# Patient Record
Sex: FEMALE | Race: BLACK OR AFRICAN AMERICAN | Employment: FULL TIME | URBAN - METROPOLITAN AREA
[De-identification: names, ages, dates, MRNs, and addresses within clinical notes are randomized per-mention and may not be internally consistent; named-entity substitution may affect disease eponyms.]

---

## 2020-08-03 ENCOUNTER — DOCUMENTATION ONLY (OUTPATIENT)
Dept: NEUROLOGY | Age: 30
End: 2020-08-03

## 2020-08-03 ENCOUNTER — OFFICE VISIT (OUTPATIENT)
Dept: NEUROLOGY | Age: 30
End: 2020-08-03
Payer: COMMERCIAL

## 2020-08-03 VITALS
SYSTOLIC BLOOD PRESSURE: 118 MMHG | DIASTOLIC BLOOD PRESSURE: 76 MMHG | HEART RATE: 66 BPM | HEIGHT: 63 IN | WEIGHT: 212 LBS | RESPIRATION RATE: 20 BRPM | BODY MASS INDEX: 37.56 KG/M2

## 2020-08-03 DIAGNOSIS — R20.2 NUMBNESS AND TINGLING IN LEFT HAND: ICD-10-CM

## 2020-08-03 DIAGNOSIS — G43.109 COMPLICATED MIGRAINE: Primary | ICD-10-CM

## 2020-08-03 DIAGNOSIS — E66.01 SEVERE OBESITY (HCC): ICD-10-CM

## 2020-08-03 DIAGNOSIS — R20.0 NUMBNESS AND TINGLING IN LEFT HAND: ICD-10-CM

## 2020-08-03 PROCEDURE — 99205 OFFICE O/P NEW HI 60 MIN: CPT | Performed by: PSYCHIATRY & NEUROLOGY

## 2020-08-03 RX ORDER — ONDANSETRON 4 MG/1
TABLET, ORALLY DISINTEGRATING ORAL
COMMUNITY
Start: 2020-07-27 | End: 2022-02-22

## 2020-08-03 RX ORDER — NORETHINDRONE ACETATE AND ETHINYL ESTRADIOL AND FERROUS FUMARATE 1MG-20(21)
KIT ORAL
COMMUNITY
Start: 2020-07-13 | End: 2022-02-22

## 2020-08-03 RX ORDER — TOPIRAMATE 25 MG/1
TABLET ORAL
Qty: 120 TAB | Refills: 1 | Status: SHIPPED | OUTPATIENT
Start: 2020-08-03 | End: 2020-09-02 | Stop reason: ALTCHOICE

## 2020-08-03 RX ORDER — BUTALBITAL, ACETAMINOPHEN AND CAFFEINE 50; 325; 40 MG/1; MG/1; MG/1
TABLET ORAL
COMMUNITY
Start: 2020-07-27 | End: 2022-02-22

## 2020-08-03 NOTE — PROGRESS NOTES
Has a history of migraines off and on since she was in the 5th grade, really bad with her last pregnancy, in the last 2 months have started to become more frequent and more intense   They are daily now, they do get to the intensity that she has nausea and vomitting   Light/sound sensitivity

## 2020-08-03 NOTE — PATIENT INSTRUCTIONS
Migraine Headache: Care Instructions Your Care Instructions Migraines are painful, throbbing headaches that often start on one side of the head. They may cause nausea and vomiting and make you sensitive to light, sound, or smell. Without treatment, migraines can last from 4 hours to a few days. Medicines can help prevent migraines or stop them after they have started. Your doctor can help you find which ones work best for you. Follow-up care is a key part of your treatment and safety. Be sure to make and go to all appointments, and call your doctor if you are having problems. It's also a good idea to know your test results and keep a list of the medicines you take. How can you care for yourself at home? · Do not drive if you have taken a prescription pain medicine. · Rest in a quiet, dark room until your headache is gone. Close your eyes, and try to relax or go to sleep. Don't watch TV or read. · Put a cold, moist cloth or cold pack on the painful area for 10 to 20 minutes at a time. Put a thin cloth between the cold pack and your skin. · Use a warm, moist towel or a heating pad set on low to relax tight shoulder and neck muscles. · Have someone gently massage your neck and shoulders. · Take your medicines exactly as prescribed. Call your doctor if you think you are having a problem with your medicine. You will get more details on the specific medicines your doctor prescribes. · Don't take medicine for headache pain too often. Talk to your doctor if you are taking medicine more than 2 days a week to stop a headache. Taking too much pain medicine can lead to more headaches. These are called medicine-overuse headaches. To prevent migraines · Keep a headache diary so you can figure out what triggers your headaches. Avoiding triggers may help you prevent headaches. Record when each headache began, how long it lasted, and what the pain was like.  Write down any other symptoms you had with the headache, such as nausea, flashing lights or dark spots, or sensitivity to bright light or loud noise. Note if the headache occurred near your period. List anything that might have triggered the headache. Triggers may include certain foods (chocolate, cheese, wine) or odors, smoke, bright light, stress, or lack of sleep. · If your doctor has prescribed medicine for your migraines, take it as directed. You may have medicine that you take only when you get a migraine and medicine that you take all the time to help prevent migraines. ? If your doctor has prescribed medicine for when you get a headache, take it at the first sign of a migraine, unless your doctor has given you other instructions. ? If your doctor has prescribed medicine to prevent migraines, take it exactly as prescribed. Call your doctor if you think you are having a problem with your medicine. · Find healthy ways to deal with stress. Migraines are most common during or right after stressful times. Try finding ways to reduce stress like practicing mindfulness or deep breathing exercises. · Get plenty of sleep and exercise. But be careful to not push yourself too hard during exercise. It may trigger a headache. · Eat meals on a regular schedule. Avoid foods and drinks that often trigger migraines. These include chocolate, alcohol (especially red wine and port), aspartame, monosodium glutamate (MSG), and some additives found in foods (such as hot dogs, arriola, cold cuts, aged cheeses, and pickled foods). · Limit caffeine. Don't drink too much coffee, tea, or soda. But don't quit caffeine suddenly. That can also give you migraines. · Do not smoke or allow others to smoke around you. If you need help quitting, talk to your doctor about stop-smoking programs and medicines. These can increase your chances of quitting for good.  
· If you are taking birth control pills or hormone therapy, talk to your doctor about whether they are triggering your migraines. When should you call for help? [id number] anytime you think you may need emergency care. For example, call if: 
· You have signs of a stroke. These may include: 
? Sudden numbness, paralysis, or weakness in your face, arm, or leg, especially on only one side of your body. ? Sudden vision changes. ? Sudden trouble speaking. ? Sudden confusion or trouble understanding simple statements. ? Sudden problems with walking or balance. ? A sudden, severe headache that is different from past headaches. Call your doctor now or seek immediate medical care if: 
· You have new or worse nausea and vomiting. · You have a new or higher fever. · Your headache gets much worse. Watch closely for changes in your health, and be sure to contact your doctor if: 
· You are not getting better after 2 days (48 hours). Where can you learn more? Go to http://lucille-guille.info/ Enter M496 in the search box to learn more about \"Migraine Headache: Care Instructions. \" Current as of: November 20, 2019               Content Version: 12.5 © 7419-6113 [employer]. Care instructions adapted under license by [employer] (which disclaims liability or warranty for this information). If you have questions about a medical condition or this instruction, always ask your healthcare professional. [address] any warranty or liability for your use of this information. Numbness and Tingling: Care Instructions Your Care Instructions Many things can cause numbness or tingling. Swelling may put pressure on a nerve. This could cause you to lose feeling or have a pins-and-needles sensation on part of your body. Nerves may be damaged from trauma, toxins, or diseases, such as diabetes or multiple sclerosis (MS). Sometimes, though, the cause is not clear. If there is no clear reason for your symptoms, and you are not having any other symptoms, your doctor may suggest watching and waiting for a while to see if the numbness or tingling goes away on its own. Your doctor may want you to have blood or nerve tests to find the cause of your symptoms. Follow-up care is a key part of your treatment and safety. Be sure to make and go to all appointments, and call your doctor if you are having problems. It's also a good idea to know your test results and keep a list of the medicines you take. How can you care for yourself at home? · If your doctor prescribes medicine, take it exactly as directed. Call your doctor if you think you are having a problem with your medicine. · If you have any swelling, put ice or a cold pack on the area for 10 to 20 minutes at a time. Put a thin cloth between the ice and your skin. When should you call for help? [id number] anytime you think you may need emergency care. For example, call if: 
· You have weakness, numbness, or tingling in both legs. · You lose bowel or bladder control. · You have symptoms of a stroke. These may include: 
? Sudden numbness, tingling, weakness, or loss of movement in your face, arm, or leg, especially on only one side of your body. ? Sudden vision changes. ? Sudden trouble speaking. ? Sudden confusion or trouble understanding simple statements. ? Sudden problems with walking or balance. ? A sudden, severe headache that is different from past headaches. Watch closely for changes in your health, and be sure to contact your doctor if you have any problems, or if: 
· You do not get better as expected. Where can you learn more? Go to http://www.gray.com/ Enter H267 in the search box to learn more about \"Numbness and Tingling: Care Instructions. \" Current as of: November 20, 2019               Content Version: 12.5 © 3453-0549 [employer]. Care instructions adapted under license by [employer] (which disclaims liability or warranty for this information). If you have questions about a medical condition or this instruction, always ask your healthcare professional. [address] any warranty or liability for your use of this information.

## 2020-08-03 NOTE — PROGRESS NOTES
NEUROLOGY CLINIC NOTE    Patient ID:  Trae Winn  [id number]  35 y.o.  1990    Date of Consultation:  August 3, 2020    Referring Physician: [facility]    Reason for Consultation:  Headache and left sided numbness    Chief Complaint   Patient presents with    New Patient     headaches and numbness on left side of body for 3 days straight (hands, arms and legs)        History of Present Illness:     Patient Active Problem List    Diagnosis Date Noted    Severe obesity ([address] 2020     Past Medical History:   Diagnosis Date    Anxiety disorder     Depression     Headache     Vision decreased       Past Surgical History:   Procedure Laterality Date    HX  SECTION   and       Prior to Admission medications    Medication Sig Start Date End Date Taking? Authorizing Provider   Junel FE 1/20, 28, 1 mg-20 mcg (21)/75 mg (7) tab TAKE 1 TABLET BY MOUTH DAILY 20  Yes Provider, Historical   butalbital-acetaminophen-caffeine (FIORICET, ESGIC) -40 mg per tablet TAKE 1 TABLET BY MOUTH EVERY 6 HOURS AS NEEDED FOR HEADACHE 20  Yes Provider, Historical   ondansetron (ZOFRAN ODT) 4 mg disintegrating tablet PLACE 1 ON TONGUE EVERY 8 HOURS AS NEEDED FOR NAUSEA AND VOMITING 20  Yes Provider, Historical     No Known Allergies   Social History     Tobacco Use    Smoking status: Not on file   Substance Use Topics    Alcohol use: Not on file      No family history on file. Subjective:      Trae Winn is a 35 y.o. obese RH female with no significant medical history who was referred here from [facility] for further evaluation of her headaches and numbness. Concern was condition lasted ore than a day to 3 days of headache and numbness. Patient started having headaches when she started her menstrual cycle. Missed school because of it. Saw a doctor before who recommended Botox but deferred.  Headaches are worse now because they are more frequent and intense for the past two months. Gradual onset. Right temple and does not radiate anywhere. Throbbing pain. Starts at a 4/10 and at its worse a 10/10. Associated nausea, blurring of vision in both eyes, sensitive to light and noise, and numbness/tingling of the left hand and fingers. Lasts for 1 day and recently 3 days. Waxes and wanes. Currently having 1 per week. Last severe headache 9 days PTC. Skipping meals and stress may bring on a headache. Worse with physical activity. Cold compress, dark room and rest may help. OTC medications currently are not helping when Ibuprofen use to help. Fioricet helps. Ondansetron helps the nausea. No imaging done. Outside reports reviewed: none. Review of Systems:    A comprehensive review of systems was performed:   Constitutional: positive for poor appetite  Eyes: positive for visual disturbance  Ears, nose, mouth, throat, and face: positive for none  Respiratory: positive for none  Cardiovascular: positive for none  Gastrointestinal: positive for nausea  Genitourinary: positive for none  Integument/breast: positive for none  Hematologic/lymphatic: positive for none  Musculoskeletal: positive for none  Neurological: positive for headaches, numbness  Behavioral/Psych: positive for anxiety, depression  Endocrine: positive for none  Allergic/Immunologic: positive for none      Objective:     Visit Vitals  /76   Pulse 66   Resp 20   Ht 5' 3\" (1.6 m)   Wt 96.2 kg (212 lb)   BMI 37.55 kg/m²       PHYSICAL EXAM:    General Appearance: Alert, patient appears stated age. General:  Obese, patient in no apparent distress. Head/Face: The head is normocephalic and atraumatic. Eyes: Conjunctivae appear normal. Sclera appear normal and non-icteric. Nose (and Sinus):   No abnormality of the nose or sinuses is noted. Oral:   Throat is clear. Lymphatics:  No lymphadenopathy in the neck/head. Neck and Thyroid:   No bruits noted in the neck. Respiratory:  Lungs clear to auscultation. Cardiovascular:  Palpation and auscultation: regular rate and rhythm. Extremity: No joint swelling, erythema or pedal edema. NEUROLOGICAL EXAM:    Appearance: The patient is obese, provides a coherent history and is in no acute distress. Mental Status: Oriented to time, place and person. Fluent, no aphasia or dysarthria. Mood and affect appropriate. Cranial Nerves:   Intact visual fields. Fundi are benign. AJ, EOM's full, no nystagmus, no ptosis. Facial sensation is normal. Corneal reflexes are intact. Facial movement is symmetric. Hearing is normal bilaterally. Palate is midline with normal elevation. Sternocleidomastoid and trapezius muscles are normal. Tongue is midline. Motor:  5/5 strength in upper and lower proximal and distal muscles. Normal bulk and tone. No fasciculations. No pronator drift. Reflexes:   Deep tendon reflexes 2+/4 and symmetrical. Downgoing toes. Sensory:   Normal to cold, pinprick and vibration. Gait:  Normal gait. No Romberg. Can do tandem walking. Tremor:   No tremor noted. Cerebellar:  Intact FTN/MICKEY/HTS. Neurovascular:  Normal heart sounds and regular rhythm, peripheral pulses intact, and no carotid bruits. Assessment:   Complicated migraine - worsening  Numbness and tingling in the left hand  Severe obesity    Plan:   Neurological exam was non-focal. Need to consider complex or complicated migraines causing stroke like symptoms. Given patient's age, need to look for congenital or vascular pathology or subtle stroke. Brain MRI without contrast was ordered. Need to also assess for presence of a patent foramen ovale that is typically seen in this condition. Echocardiogram with bubble study was ordered to further assess. Given the frequency of her head warrants maintenance therapy. Trial of topiramate 25 mg at bedtime initially and up to 50 mg twice daily if necessary. Prescription was provided. Continue Fioricet for abortive therapy.   Further intervention be done pending results of testing. Potential trial of triptan's. Advised to refrain from things that could trigger a headache (dairy, chocolate, wine and etc.). Advised to keep a headache log. I need to obtain a copy [facility].    Episodic left hand numbness and tingling in relation to her headaches is concerning for possible complex or complicated migraines with strokelike symptoms versus acute stroke. Brain MRI as ordered above. If brain MRI is unremarkable and condition persist then will do EMG/NCS studies of the left upper extremity to look for entrapment neuropathy. Encourage weight loss. Use of Topiramate for migraine prophylaxis may help with weight loss as well. Recommend routine structured exercises. All questions and concerns were answered. This note was created using voice recognition software. Despite editing, there may be syntax errors.

## 2020-08-17 ENCOUNTER — HOSPITAL ENCOUNTER (OUTPATIENT)
Dept: NON INVASIVE DIAGNOSTICS | Age: 30
Discharge: HOME | End: 2020-08-17
Attending: PSYCHIATRY & NEUROLOGY
Payer: COMMERCIAL

## 2020-08-17 ENCOUNTER — HOSPITAL ENCOUNTER (OUTPATIENT)
Dept: MRI IMAGING | Age: 30
Discharge: HOME | End: 2020-08-17
Attending: PSYCHIATRY & NEUROLOGY
Payer: COMMERCIAL

## 2020-08-17 VITALS
SYSTOLIC BLOOD PRESSURE: 121 MMHG | DIASTOLIC BLOOD PRESSURE: 70 MMHG | WEIGHT: 212 LBS | BODY MASS INDEX: 37.56 KG/M2 | HEIGHT: 63 IN

## 2020-08-17 DIAGNOSIS — R20.0 NUMBNESS AND TINGLING IN LEFT HAND: ICD-10-CM

## 2020-08-17 DIAGNOSIS — R20.2 NUMBNESS AND TINGLING IN LEFT HAND: ICD-10-CM

## 2020-08-17 DIAGNOSIS — G43.109 COMPLICATED MIGRAINE: ICD-10-CM

## 2020-08-17 LAB
ECHO AO ASC DIAM: 2.16 CM
ECHO AV AREA PEAK VELOCITY: 2.12 CM2
ECHO AV AREA PEAK VELOCITY: 2.17 CM2
ECHO AV AREA PEAK VELOCITY: 2.23 CM2
ECHO AV AREA PEAK VELOCITY: 2.28 CM2
ECHO AV AREA VTI: 2.32 CM2
ECHO AV AREA/BSA VTI: 1.2 CM2/M2
ECHO AV MEAN GRADIENT: 4.5 MMHG
ECHO AV PEAK GRADIENT: 10.39 MMHG
ECHO AV PEAK GRADIENT: 9.38 MMHG
ECHO AV PEAK VELOCITY: 153.1 CM/S
ECHO AV PEAK VELOCITY: 161.16 CM/S
ECHO AV VTI: 30.62 CM
ECHO EST RA PRESSURE: 10 MMHG
ECHO LA AREA 4C: 17.43 CM2
ECHO LA MAJOR AXIS: 2.83 CM
ECHO LA MINOR AXIS: 1.43 CM
ECHO LA VOL 4C: 50.09 ML (ref 22–52)
ECHO LA VOLUME INDEX A4C: 25.27 ML/M2 (ref 16–28)
ECHO LV E' LATERAL VELOCITY: 14.16 CENTIMETER/SECOND
ECHO LV E' SEPTAL VELOCITY: 13.23 CENTIMETER/SECOND
ECHO LV EDV A4C: 125.83 ML
ECHO LV EDV INDEX A4C: 63.5 ML/M2
ECHO LV EJECTION FRACTION A4C: 57 PERCENT
ECHO LV ESV A4C: 54.4 ML
ECHO LV ESV INDEX A4C: 27.4 ML/M2
ECHO LV INTERNAL DIMENSION DIASTOLIC MMODE: 4.54 CM
ECHO LV INTERNAL DIMENSION DIASTOLIC: 4.74 CM (ref 3.9–5.3)
ECHO LV INTERNAL DIMENSION DIASTOLIC: 5.16 CM (ref 3.9–5.3)
ECHO LV INTERNAL DIMENSION SYSTOLIC MMODE: 2.9 CM
ECHO LV INTERNAL DIMENSION SYSTOLIC: 3.1 CM
ECHO LV INTERNAL DIMENSION SYSTOLIC: 3.48 CM
ECHO LV IVSD: 0.77 CM (ref 0.6–0.9)
ECHO LV POSTERIOR WALL DIASTOLIC: 1.21 CM (ref 0.6–0.9)
ECHO LVOT DIAM: 1.77 CM
ECHO LVOT PEAK GRADIENT: 7.66 MMHG
ECHO LVOT PEAK GRADIENT: 8.05 MMHG
ECHO LVOT PEAK VELOCITY: 138.37 CM/S
ECHO LVOT PEAK VELOCITY: 141.88 CM/S
ECHO LVOT SV: 70.9 ML
ECHO LVOT VTI: 28.75 CM
ECHO MV A VELOCITY: 75.9 CENTIMETER/SECOND
ECHO MV AREA PHT: 4.26 CM2
ECHO MV AREA PHT: 4.51 CM2
ECHO MV AREA VTI: 2.3 CM2
ECHO MV E DECELERATION TIME (DT): 0.17 S
ECHO MV E VELOCITY: 107.1 CENTIMETER/SECOND
ECHO MV MAX VELOCITY: 132.87 CM/S
ECHO MV MEAN GRADIENT: 2.13 MMHG
ECHO MV PEAK GRADIENT: 7.06 MMHG
ECHO MV PRESSURE HALF TIME (PHT): 0.05 S
ECHO MV PRESSURE HALF TIME (PHT): 0.05 S
ECHO MV VTI: 30.78 CM
ECHO PV MAX VELOCITY: 102.72 CM/S
ECHO PV MAX VELOCITY: 107.7 CM/S
ECHO PV MEAN GRADIENT: 2.76 MMHG
ECHO PV PEAK INSTANTANEOUS GRADIENT SYSTOLIC: 4.22 MMHG
ECHO PV PEAK INSTANTANEOUS GRADIENT SYSTOLIC: 4.64 MMHG
ECHO PV VTI: 24.61 CM
ECHO RA AREA 4C: 14.05 CM2
ECHO RIGHT VENTRICULAR SYSTOLIC PRESSURE (RVSP): 31.42 MMHG
ECHO TV REGURGITANT MAX VELOCITY: 231.38 CM/S
ECHO TV REGURGITANT PEAK GRADIENT: 21.42 MMHG
LVOT MG: 4.14 MMHG

## 2020-08-17 PROCEDURE — 70551 MRI BRAIN STEM W/O DYE: CPT

## 2020-08-17 PROCEDURE — 96374 THER/PROPH/DIAG INJ IV PUSH: CPT

## 2020-08-18 ENCOUNTER — TELEPHONE (OUTPATIENT)
Dept: NEUROLOGY | Age: 30
End: 2020-08-18

## 2020-08-19 ENCOUNTER — OFFICE VISIT (OUTPATIENT)
Dept: NEUROLOGY | Age: 30
End: 2020-08-19
Payer: COMMERCIAL

## 2020-08-19 VITALS
DIASTOLIC BLOOD PRESSURE: 68 MMHG | HEART RATE: 83 BPM | OXYGEN SATURATION: 98 % | SYSTOLIC BLOOD PRESSURE: 112 MMHG | RESPIRATION RATE: 20 BRPM

## 2020-08-19 DIAGNOSIS — G43.109 COMPLICATED MIGRAINE: Primary | ICD-10-CM

## 2020-08-19 DIAGNOSIS — R20.2 NUMBNESS AND TINGLING IN LEFT HAND: ICD-10-CM

## 2020-08-19 DIAGNOSIS — Q21.12 PFO (PATENT FORAMEN OVALE): ICD-10-CM

## 2020-08-19 DIAGNOSIS — R20.0 NUMBNESS AND TINGLING IN LEFT HAND: ICD-10-CM

## 2020-08-19 PROCEDURE — 99214 OFFICE O/P EST MOD 30 MIN: CPT | Performed by: PSYCHIATRY & NEUROLOGY

## 2020-08-19 NOTE — PROGRESS NOTES
NEUROLOGY CLINIC NOTE    Patient ID:  Xiomara Warren  [id number]  35 y.o.  1990    Date of Visit:  2020    Referring Physician: [facility]    Reason for Visit:  Headache and left sided numbness    Chief Complaint   Patient presents with    Follow-up     test results        History of Present Illness:     Patient Active Problem List    Diagnosis Date Noted    Severe obesity ([address] 2020     Past Medical History:   Diagnosis Date    Anxiety disorder     Depression     Headache     Vision decreased       Past Surgical History:   Procedure Laterality Date    HX  SECTION   and       Prior to Admission medications    Medication Sig Start Date End Date Taking? Authorizing Provider   Junel FE 1/20, , 1 mg-20 mcg (21)/75 mg (7) tab TAKE 1 TABLET BY MOUTH DAILY 20  Yes Provider, Historical   butalbital-acetaminophen-caffeine (FIORICET, ESGIC) -40 mg per tablet TAKE 1 TABLET BY MOUTH EVERY 6 HOURS AS NEEDED FOR HEADACHE 20  Yes Provider, Historical   ondansetron (ZOFRAN ODT) 4 mg disintegrating tablet PLACE 1 ON TONGUE EVERY 8 HOURS AS NEEDED FOR NAUSEA AND VOMITING 20  Yes Provider, Historical   topiramate (TOPAMAX) 25 mg tablet Take 1 at bedtime x 1 wk; then 1 BID x 1 wk; then 1 in am and 2 in pm x 1 wk; then 2 BID  Patient taking differently: Take 25 mg by mouth.  Take 1 BID x 1 wk; then 1 in am and 2 in pm x 1 wk; then 2 BID 8/3/20  Yes Malaika Santos MD     No Known Allergies   Social History     Tobacco Use    Smoking status: Never Smoker    Smokeless tobacco: Never Used   Substance Use Topics    Alcohol use: Never     Frequency: Never      Family History   Problem Relation Age of Onset    No Known Problems Mother     No Known Problems Father         Subjective:      Xiomara Warren is a 35 y.o. obese RH female with no significant medical history who was referred here from [facility] for further evaluation of her headaches and numbness and is here for follow up. Concern was condition lasted ore than a day to 3 days of headache and numbness. Patient started having headaches when she started her menstrual cycle. Missed school because of it. Saw a doctor before who recommended Botox but deferred. Headaches are worse now because they are more frequent and intense for the past two months. Gradual onset. Right temple and does not radiate anywhere. Throbbing pain. Starts at a 4/10 and at its worse a 10/10. Associated nausea, blurring of vision in both eyes, sensitive to light and noise, and numbness/tingling of the left hand and fingers. Lasts for 1 day and recently 3 days. Waxes and wanes. Currently having 1 per week. Last severe headache 9 days PTC. Skipping meals and stress may bring on a headache. Worse with physical activity. Cold compress, dark room and rest may help. OTC medications currently are not helping when Ibuprofen use to help. Fioricet helps. Ondansetron helps the nausea. Since the last visit on 8/3/2020, patient underwent a brain MRI without contrast 8/17/2020 on my review was unremarkable with mild left maxillary sinus disease. Echocardiogram with bubble study done 8/17/2020 revealed EF of 60 to 65%. No shunting at baseline and mild right to left shunt on Valsalva maneuver. On her last visit patient was started on topiramate and is now taking 25 mg twice daily. Denies any side effects. Reports benefit. Last headache was 1 week prior to consult and was not as intense. 7/10 and responded to as needed Fioricet. Since starting the Topamax patient reports resolution of the numbness and tingling of the left hand. No new complaint. Outside reports reviewed: none.     Review of Systems:    A comprehensive review of systems was performed:   Constitutional: positive for none  Eyes: positive for visual disturbance  Ears, nose, mouth, throat, and face: positive for none  Respiratory: positive for none  Cardiovascular: positive for none  Gastrointestinal: positive for nausea  Genitourinary: positive for none  Integument/breast: positive for none  Hematologic/lymphatic: positive for none  Musculoskeletal: positive for none  Neurological: positive for headaches, numbness  Behavioral/Psych: positive for anxiety, depression  Endocrine: positive for none  Allergic/Immunologic: positive for none      Objective:     Visit Vitals  /68   Pulse 83   Resp 20   SpO2 98%       PHYSICAL EXAM:    NEUROLOGICAL EXAM:    Appearance: The patient is obese, provides a coherent history and is in no acute distress. Mental Status: Oriented to time, place and person. Fluent, no aphasia or dysarthria. Mood and affect appropriate. Cranial Nerves:   II - XII were intact. Motor:  5/5 strength in upper and lower proximal and distal muscles. Normal bulk and tone. No fasciculations. No pronator drift. Reflexes:   Deep tendon reflexes 2+/4 and symmetrical. Downgoing toes. Sensory:   Normal to cold, pinprick and vibration. Gait:  Normal gait. No Romberg. Can do tandem walking. Tremor:   No tremor noted. Cerebellar:  Intact FTN/MICKEY/HTS. Assessment:   Complicated migraine - stable with exacerbation  Patent foramen ovale  Numbness and tingling in the left hand    Plan:   Neurological exam is unchanged. It is still non-focal. Need to consider complex or complicated migraines causing stroke like symptoms. Given patient's age, need to look for congenital or vascular pathology or subtle stroke. Brain MRI without contrast did not reveal any significant pathology. Mild left maxillary sinus disease. Echocardiogram with bubble study revealed normal EF with shunting on valsalva maneuver. Positive for PFO. Given the frequency of her head warrants maintenance therapy. Continue Topiramate 25 mg BID and up to 50 mg twice daily if necessary. Continue Fioricet for abortive therapy. Further intervention be done pending results of testing.   Potential trial of triptan's. Advised to refrain from things that could trigger a headache (dairy, chocolate, wine and etc.). Advised to keep a headache log. I need to obtain a copy of [facility] records [facility].    PFO on echocardiogram.  Discussed given the results of her brain MRI being unremarkable with no evidence of old stroke or new stroke, patient was started on aspirin 81 mg daily. Patient also referred to cardiology for further evaluation. Condition has resolved. Episodic left hand numbness and tingling in relation to her headaches is concerning for possible complex or complicated migraines with strokelike symptoms. Brain MRI was unremarkable. If condition recurs, then I will do EMG/NCS studies of the left upper extremity to look for entrapment neuropathy. All questions and concerns were answered. This note was created using voice recognition software. Despite editing, there may be syntax errors.

## 2020-09-02 ENCOUNTER — OFFICE VISIT (OUTPATIENT)
Dept: CARDIOLOGY CLINIC | Age: 30
End: 2020-09-02
Payer: COMMERCIAL

## 2020-09-02 VITALS
WEIGHT: 216 LBS | RESPIRATION RATE: 20 BRPM | BODY MASS INDEX: 38.27 KG/M2 | DIASTOLIC BLOOD PRESSURE: 60 MMHG | HEIGHT: 63 IN | SYSTOLIC BLOOD PRESSURE: 110 MMHG | HEART RATE: 94 BPM

## 2020-09-02 DIAGNOSIS — Q21.12 PFO (PATENT FORAMEN OVALE): Primary | ICD-10-CM

## 2020-09-02 PROCEDURE — 99203 OFFICE O/P NEW LOW 30 MIN: CPT | Performed by: INTERNAL MEDICINE

## 2020-09-02 PROCEDURE — 93000 ELECTROCARDIOGRAM COMPLETE: CPT | Performed by: INTERNAL MEDICINE

## 2020-09-02 RX ORDER — TOPIRAMATE 25 MG/1
50 TABLET ORAL DAILY
COMMUNITY
End: 2020-09-24 | Stop reason: SDUPTHER

## 2020-09-02 NOTE — PROGRESS NOTES
Visit Vitals  /60   Pulse 94   Resp 20   Ht 5' 3\" (1.6 m)   Wt 216 lb (98 kg)   BMI 38.26 kg/m²     PFO by ECHO  No complaints  migraines

## 2020-09-02 NOTE — PROGRESS NOTES
Reason for Consult: PFO    Referring: Dr. Justyn Brothers    HPI: Sebastian Singh is a 35 y.o. female with no significant past medical history is being evaluated for PFO identified an echocardiogram performed on August 17, 2020. Echo was performed because of her history of migraine headaches and possible symptoms of TIA or stroke. She also has symptoms of left side numbness and tingling of the arm. Her MRI of the brain was negative for stroke or TIA. Echocardiogram demonstrated a small right-to-left shunt on agitated saline study with provocative testing but no shunting noted at baseline. EF was normal with mild LVH of the posterior wall and trace mitral regurgitation. She has not had any previous history of stroke. No previous history of deoxygenation. She has had 2 pregnancies without any events. She does not smoke tobacco.  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 EKG today demonstrated normal sinus rhythm, normal axis, normal intervals, normal ST segment. Echocardiogram from August 17, 2020 was personally reviewed and was personally read by me. Plan:    1. PFO: Currently she has a very small right-to-left shunt on provocative maneuvers only. Clinically as well as from imaging standpoint there is no clear indication that this PFO is causing any symptoms. At this time will only recommend observation. Since there is no true TIA event therefore I think she does not require ASA from the PFO standpoint. She can stop aspirin if needed. No further follow-up is recommended at this time. If she has any stroke in future then she may require further evaluation of this PFO. We discussed the etiology, pathophysiology as well as prognosis of the PFO. 2.  I advised her to monitor her blood pressure at home. 3.  Follow-up as needed. ATTENTION:   This medical record was transcribed using an electronic medical records/speech recognition system.   Although proofread, it may and can contain electronic, spelling and other errors. Corrections may be executed at a later time. Please feel free to contact us for any clarifications as needed.       Past Medical History:   Diagnosis Date    Anxiety disorder     Depression     Headache     Vision decreased             Past Surgical History:   Procedure Laterality Date    HX  SECTION   and              Family History   Problem Relation Age of Onset    No Known Problems Mother     No Known Problems Father            Social History     Socioeconomic History    Marital status:      Spouse name: Not on file    Number of children: Not on file    Years of education: Not on file    Highest education level: Not on file   Occupational History    Not on file   Social Needs    Financial resource strain: Not on file    Food insecurity     Worry: Not on file     Inability: Not on file    Transportation needs     Medical: Not on file     Non-medical: Not on file   Tobacco Use    Smoking status: Never Smoker    Smokeless tobacco: Never Used   Substance and Sexual Activity    Alcohol use: Never     Frequency: Never    Drug use: Never    Sexual activity: Yes     Partners: Male     Birth control/protection: Pill   Lifestyle    Physical activity     Days per week: Not on file     Minutes per session: Not on file    Stress: Not on file   Relationships    Social connections     Talks on phone: Not on file     Gets together: Not on file     Attends Orthodoxy service: Not on file     Active member of club or organization: Not on file     Attends meetings of clubs or organizations: Not on file     Relationship status: Not on file    Intimate partner violence     Fear of current or ex partner: Not on file     Emotionally abused: Not on file     Physically abused: Not on file     Forced sexual activity: Not on file   Other Topics Concern    Not on file   Social History Narrative    Not on file         No Known Allergies         Current Outpatient Medications   Medication Sig Dispense Refill    topiramate (Topamax) 25 mg tablet Take 50 mg by mouth daily.  Junel FE 1/20, 28, 1 mg-20 mcg (21)/75 mg (7) tab TAKE 1 TABLET BY MOUTH DAILY      butalbital-acetaminophen-caffeine (FIORICET, ESGIC) -40 mg per tablet TAKE 1 TABLET BY MOUTH EVERY 6 HOURS AS NEEDED FOR HEADACHE      ondansetron (ZOFRAN ODT) 4 mg disintegrating tablet PLACE 1 ON TONGUE EVERY 8 HOURS AS NEEDED FOR NAUSEA AND VOMITING          ROS:  12 point review of systems was performed.  All negative except for HPI     Physical Exam:  Visit Vitals  /60   Pulse 94   Resp 20   Ht 5' 3\" (1.6 m)   Wt 216 lb (98 kg)   BMI 38.26 kg/m²       Gen:  Well-developed, well-nourished, in no acute distress  HEENT:  Pink conjunctivae, PERRL, hearing intact to voice, moist mucous membranes  Neck:  Supple, without masses, thyroid non-tender  Resp:  No accessory muscle use, clear breath sounds without wheezes rales or rhonchi  Card:  No murmurs, normal S1, S2 without thrills, bruits or peripheral edema  Abd:  Soft, non-tender, non-distended, normoactive bowel sounds are present, no palpable organomegaly and no detectable hernias  Lymph:  No cervical or inguinal adenopathy  Musc:  No cyanosis or clubbing  Skin:  No rashes or ulcers, skin turgor is good  Neuro:  Cranial nerves are grossly intact, no focal motor weakness, follows commands appropriately  Psych:  Good insight, oriented to person, place and time, alert     Labs:     No results found for: WBC, WBCT, WBCPOC, HGB, HGBPOC, HCT, HCTPOC, PLT, PLTPOC, MCV, MCVPOC, HGBEXT, HCTEXT, PLTEXT  No results found for: HBA1C, BIS5LKBK, HGBE8, GLU, GESTF, GLUCPOC, MCACR, MCA1, MCA2, MCA3, MCAU, LDL, LDLC, DLDLP, FRANK, CREAPOC, ACREA, CREA, REFC3, REFC4, IVV1VBJC   No results found for: CHOL, CHOLPOCT, HDL, LDL, LDLC, LDLCPOC, LDLCEXT, TRIGL, TGLPOCT, CHHD, CHHDX  No results found for: ALTPOC, ALT, ASTPOC, GGT, AP, APIT, APX, CBIL, TBIL, TBILI, ALB, ALBPOC, TP, NH3, NH4, INR, INREXT, PTP, PTINR, PTEXT, PLT, PLTPOC, HCABQL, HBSAG, AFP, INREXT, PTEXT, PLTEXT  No results found for: INR, INREXT, PTMR, PTP, PT1, PT2, INREXT   No results found for: GFRNA, GFRNAPOC, GFRAA, GFRAAPOC, CREA, CREAPOC, BUN, IBUN, BUNPOC, NA, NAPOC, K, KPOCT, CL, CLPOC, CO2, CO2POC, MG, PHOS, ALBEU, PTH, PTHILT, EPO  No results found for: PSA, Narvis Harder, PSA3, PSAR3, [id number], [id number], PSALT  No results found for: TSH, TSH2, TSH3, TSHP, TSHELE, TSHEXT, TT3, T3U, T3UP, FRT3, FT3, FT4, FT4P, T4, T4P, FT4T, TT7, TSHEXT   No results found for: GLU, GLUCPOC   No results found for: CPK, RCK1, RCK2, RCK3, RCK4, CKMB, CKNDX, CKND1, TROPT, TROIQ, BNPP, BNP   No results found for: BNP, BNPP, BNPPPOC, XBNPT, BNPNT   No results found for: NA, K, CL, CO2, AGAP, GLU, BUN, CREA, BUCR, GFRAA, GFRNA, CA, GFRAA   No results found for: NA, K, CL, CO2, AGAP, GLU, BUN, CREA, BUCR, GFRAA, GFRNA, CA, TBIL, TBILI, ALT, AP, TP, ALB, GLOB, AGRAT   No results found for: HBA1C, [id number], HGBE8, MLW7VLMN, [id number]      No results for input(s): CPK, CKMB, TROIQ in the last 72 hours. No lab exists for component: CKQMB, CPKMB        Problem List:     Problem List  Never Reviewed          Codes Class Noted    Severe obesity ([facility] [address] ICD-10-CM: E66.01  ICD-9-CM: 278.01  8/3/2020                Artem De Oliveira MD, [facility] [place]

## 2020-09-24 ENCOUNTER — OFFICE VISIT (OUTPATIENT)
Dept: NEUROLOGY | Age: 30
End: 2020-09-24
Payer: COMMERCIAL

## 2020-09-24 VITALS
DIASTOLIC BLOOD PRESSURE: 70 MMHG | RESPIRATION RATE: 20 BRPM | TEMPERATURE: 97.1 F | OXYGEN SATURATION: 99 % | HEART RATE: 84 BPM | SYSTOLIC BLOOD PRESSURE: 110 MMHG

## 2020-09-24 DIAGNOSIS — G43.109 COMPLICATED MIGRAINE: Primary | ICD-10-CM

## 2020-09-24 DIAGNOSIS — Q21.12 PFO (PATENT FORAMEN OVALE): ICD-10-CM

## 2020-09-24 DIAGNOSIS — R20.2 NUMBNESS AND TINGLING IN LEFT HAND: ICD-10-CM

## 2020-09-24 DIAGNOSIS — R20.0 NUMBNESS AND TINGLING IN LEFT HAND: ICD-10-CM

## 2020-09-24 PROCEDURE — 99214 OFFICE O/P EST MOD 30 MIN: CPT | Performed by: PSYCHIATRY & NEUROLOGY

## 2020-09-24 RX ORDER — TOPIRAMATE 50 MG/1
100 TABLET, FILM COATED ORAL 2 TIMES DAILY WITH MEALS
Qty: 120 TAB | Refills: 2 | Status: SHIPPED | OUTPATIENT
Start: 2020-09-24 | End: 2020-10-08 | Stop reason: SDUPTHER

## 2020-09-24 NOTE — PROGRESS NOTES
Headaches have been good, manageable   Having 1 maybe every 2 weeks, lasting 1 hour if that with the prn medication

## 2020-09-24 NOTE — PROGRESS NOTES
NEUROLOGY CLINIC NOTE    Patient ID:  Damaso Garcia  [id number]  35 y.o.  1990    Date of Visit:  2020    Referring Physician: [facility]    Reason for Visit:  Headache and left sided numbness    Chief Complaint   Patient presents with    Follow-up     headache, numbness        History of Present Illness:     Patient Active Problem List    Diagnosis Date Noted    Severe obesity ([address] 2020     Past Medical History:   Diagnosis Date    Anxiety disorder     Depression     Headache     Vision decreased       Past Surgical History:   Procedure Laterality Date    HX  SECTION   and       Prior to Admission medications    Medication Sig Start Date End Date Taking? Authorizing Provider   topiramate (Topamax) 25 mg tablet Take 50 mg by mouth daily. Yes Provider, Historical   Junel FE 1/20, 28, 1 mg-20 mcg (21)/75 mg (7) tab TAKE 1 TABLET BY MOUTH DAILY 20  Yes Provider, Historical   butalbital-acetaminophen-caffeine (FIORICET, ESGIC) -40 mg per tablet TAKE 1 TABLET BY MOUTH EVERY 6 HOURS AS NEEDED FOR HEADACHE 20  Yes Provider, Historical   ondansetron (ZOFRAN ODT) 4 mg disintegrating tablet PLACE 1 ON TONGUE EVERY 8 HOURS AS NEEDED FOR NAUSEA AND VOMITING 20  Yes Provider, Historical     No Known Allergies   Social History     Tobacco Use    Smoking status: Never Smoker    Smokeless tobacco: Never Used   Substance Use Topics    Alcohol use: Never     Frequency: Never      Family History   Problem Relation Age of Onset    No Known Problems Mother     No Known Problems Father         Subjective:      Damaso Garcia is a 35 y.o. obese RH female with no significant medical history who was referred here from [facility] for further evaluation of her headaches and numbness and is here for follow up. Concern was condition lasted ore than a day to 3 days of headache and numbness.     Patient started having headaches when she started her menstrual cycle.   Missed school because of it. Saw a doctor before who recommended Botox but deferred. Headaches are worse now because they are more frequent and intense for the past two months. Gradual onset. Right temple and does not radiate anywhere. Throbbing pain. Starts at a 4/10 and at its worse a 10/10. Associated nausea, blurring of vision in both eyes, sensitive to light and noise, and numbness/tingling of the left hand and fingers. Lasts for 1 day and recently 3 days. Waxes and wanes. Currently having 1 per week. Last severe headache 9 days PTC. Skipping meals and stress may bring on a headache. Worse with physical activity. Cold compress, dark room and rest may help. OTC medications currently are not helping when Ibuprofen use to help. Fioricet helps. Ondansetron helps the nausea. Patient underwent a brain MRI without contrast 8/17/2020 on my review was unremarkable with mild left maxillary sinus disease. Echocardiogram with bubble study done 8/17/2020 revealed EF of 60 to 65%. No shunting at baseline and mild right to left shunt on Valsalva maneuver. Since the last visit on 8/19/2020, patient was seen by cardiology 9/2/2020 and upon their review of her echocardiogram did she did not have to take a daily aspirin. So she stopped it. Patient reports further improvement of her headaches. Decrease in frequency and intensity. She did increase her topiramate to 50 mg twice daily. Denies any side effects. She is having 1 maybe every 2 weeks lasting 1 hour and resolved with Fioricet. She is happy with her progress. Still no recurrence of her left hand numbness and tingling. No new complaint.       Outside reports reviewed: office note    Review of Systems:    A comprehensive review of systems was performed:   Constitutional: positive for none  Eyes: positive for visual disturbance  Ears, nose, mouth, throat, and face: positive for none  Respiratory: positive for none  Cardiovascular: positive for none  Gastrointestinal: positive for nausea  Genitourinary: positive for none  Integument/breast: positive for none  Hematologic/lymphatic: positive for none  Musculoskeletal: positive for none  Neurological: positive for headaches, numbness  Behavioral/Psych: positive for anxiety, depression  Endocrine: positive for none  Allergic/Immunologic: positive for none      Objective:     Visit Vitals  /70   Pulse 84   Temp 97.1 °F (36.2 °C)   Resp 20   SpO2 99%     216 lbs    PHYSICAL EXAM:    NEUROLOGICAL EXAM:    Appearance: The patient is obese, provides a coherent history and is in no acute distress. Mental Status: Oriented to time, place and person. Fluent, no aphasia or dysarthria. Mood and affect appropriate. Cranial Nerves:   II - XII were intact. Motor:  5/5 strength in upper and lower proximal and distal muscles. Normal bulk and tone. No fasciculations. No pronator drift. Reflexes:   Deep tendon reflexes 2+/4 and symmetrical. Downgoing toes. Sensory:   Normal to cold, pinprick and vibration. Gait:  Normal gait. No Romberg. Can do tandem walking. Tremor:   No tremor noted. Cerebellar:  Intact FTN/MICKEY/HTS. Assessment:   Complicated migraine - stable with exacerbation  Patent foramen ovale  Numbness and tingling in the left hand    Plan:   Neurological exam is unchanged. It is still non-focal. Need to consider complex or complicated migraines causing stroke like symptoms. Given patient's age, need to look for congenital or vascular pathology or subtle stroke. Brain MRI without contrast did not reveal any significant pathology. Mild left maxillary sinus disease. Echocardiogram with bubble study revealed normal EF with shunting on valsalva maneuver. Positive for PFO. Given the frequency of her head warrants maintenance therapy. Increase topiramate up to 100 mg twice daily. Prescription was provided. Discussed titration schedule. Continue Fioricet for abortive therapy.  Potential trial of triptan's. Advised to refrain from things that could trigger a headache (dairy, chocolate, wine and etc.). Advised to keep a headache log. PFO on echocardiogram. Discussed given the results of her brain MRI being unremarkable with no evidence of old stroke or new stroke, patient per cardiology did not warrant any need for Aspirin 81 mg daily. No further intervention from a cardiology standpoint. Condition has resolved. Episodic left hand numbness and tingling in relation to her headaches is concerning for possible complex or complicated migraines with strokelike symptoms. Brain MRI was unremarkable. If condition recurs, then I will do EMG/NCS studies of the left upper extremity to look for entrapment neuropathy. All questions and concerns were answered. This note was created using voice recognition software. Despite editing, there may be syntax errors.

## 2020-10-08 DIAGNOSIS — G43.109 COMPLICATED MIGRAINE: ICD-10-CM

## 2020-10-08 RX ORDER — TOPIRAMATE 50 MG/1
100 TABLET, FILM COATED ORAL 2 TIMES DAILY WITH MEALS
Qty: 120 TAB | Refills: 2 | Status: SHIPPED | OUTPATIENT
Start: 2020-10-08 | End: 2020-10-14 | Stop reason: SDUPTHER

## 2020-10-14 DIAGNOSIS — G43.109 COMPLICATED MIGRAINE: ICD-10-CM

## 2020-10-14 RX ORDER — TOPIRAMATE 50 MG/1
100 TABLET, FILM COATED ORAL 2 TIMES DAILY WITH MEALS
Qty: 120 TAB | Refills: 2 | Status: SHIPPED | OUTPATIENT
Start: 2020-10-14

## 2020-10-14 NOTE — TELEPHONE ENCOUNTER
----- Message from Roselia Lizarraga Page sent at 10/14/2020  2:58 PM EDT -----  Regarding: Reymundo Abraham/ Refill  Medication Refill    Caller (if not patient):n/a      Relationship of caller (if not patient):      Best contact number(s):[phone]      Name of medication and dosage if known: Topramax 50mg,       Is patient out of this medication (yes/no): Yes      Pharmacy name: [address] [facility] listed in chart? (yes/no):  No. Please update  Pharmacy phone number: [phone]      Details to clarify the request: Please update pharmacy info to [facility] as preferred pharmacy under patient's insurance      Angela Delarosa

## 2022-02-22 ENCOUNTER — APPOINTMENT (OUTPATIENT)
Dept: GENERAL RADIOLOGY | Age: 32
End: 2022-02-22
Attending: EMERGENCY MEDICINE
Payer: COMMERCIAL

## 2022-02-22 ENCOUNTER — HOSPITAL ENCOUNTER (EMERGENCY)
Age: 32
Discharge: HOME | End: 2022-02-22
Attending: EMERGENCY MEDICINE
Payer: COMMERCIAL

## 2022-02-22 VITALS
HEART RATE: 69 BPM | BODY MASS INDEX: 37.39 KG/M2 | WEIGHT: 211 LBS | OXYGEN SATURATION: 99 % | SYSTOLIC BLOOD PRESSURE: 112 MMHG | RESPIRATION RATE: 20 BRPM | DIASTOLIC BLOOD PRESSURE: 70 MMHG | TEMPERATURE: 98.5 F | HEIGHT: 63 IN

## 2022-02-22 DIAGNOSIS — M22.2X2 PATELLOFEMORAL PAIN SYNDROME OF LEFT KNEE: Primary | ICD-10-CM

## 2022-02-22 PROCEDURE — 73562 X-RAY EXAM OF KNEE 3: CPT

## 2022-02-22 PROCEDURE — 99282 EMERGENCY DEPT VISIT SF MDM: CPT

## 2022-02-22 NOTE — ED PROVIDER NOTES
EMERGENCY DEPARTMENT HISTORY AND PHYSICAL EXAM        Date: 2022  Patient Name: Landy Mohan    History of Presenting Illness     Chief Complaint   Patient presents with    Knee Pain       History Provided By: Patient    HPI: Landy Mohan, 28 y.o. female with history of depression, anxiety, headaches who presents with left knee pain. States symptoms started within the last couple of days. Pain is in the left knee, anterior, below the kneecap, worse with ambulating and movement. States that she has been working out more than previously and and going to the gym more often. She last worked her legs 1 week ago. No injuries or falls. No redness. She has having some swelling to the left knee. PCP: None    Current Outpatient Medications   Medication Sig Dispense Refill    topiramate (TOPAMAX) 50 mg tablet Take 2 Tabs by mouth two (2) times daily (with meals). 120 Tab 2       Past History     Past Medical History:  Past Medical History:   Diagnosis Date    Anxiety disorder     Depression     Headache     Vision decreased        Past Surgical History:  Past Surgical History:   Procedure Laterality Date    HX  SECTION   and        Family History:  Family History   Problem Relation Age of Onset    No Known Problems Mother     No Known Problems Father        Social History:  Social History     Tobacco Use    Smoking status: Never Smoker    Smokeless tobacco: Never Used   Substance Use Topics    Alcohol use: Never    Drug use: Never       Allergies:  No Known Allergies      Review of Systems   Review of Systems   Constitutional: Negative for fever. HENT: Negative for congestion. Eyes: Negative for visual disturbance. Respiratory: Negative for shortness of breath. Cardiovascular: Negative for chest pain. Gastrointestinal: Negative for abdominal pain. Genitourinary: Negative for dysuria. Musculoskeletal: Negative for arthralgias.         Positive for left knee pain Skin: Negative for rash. Neurological: Negative for headaches. Physical Exam   Constitutional: No acute distress. Well-nourished. Skin: No rash. ENT: No rhinorrhea. No cough. Head is normocephalic and atraumatic. Eye: No proptosis or conjunctival injections. Respiratory: No apparent respiratory distress. Gastrointestinal: Nondistended. Musculoskeletal: No obvious bony deformities. No obvious swelling to the knees bilaterally. Tenderness to the left knee anteriorly below the kneecap. No other tenderness to the knee. No ligamentous laxity. Psychiatric: Cooperative. Appropriate mood and affect. Diagnostic Study Results     Labs -   No results found for this or any previous visit (from the past 24 hour(s)). Radiologic Studies -   XR KNEE LT 3 V    (Results Pending)     CT Results  (Last 48 hours)    None        CXR Results  (Last 48 hours)    None          Medical Decision Making and ED Course     I reviewed the available vital signs, nursing notes, past medical history, past surgical history, family history, and social history. Vital Signs - Reviewed the patient's vital signs. Patient Vitals for the past 12 hrs:   Temp Pulse Resp BP SpO2   02/22/22 1513 98.5 °F (36.9 °C) 69 20 112/70 99 %     Medical Decision Making:   Presented with left knee pain. The differential diagnosis is fracture, dislocation, arthritis, bursitis, overuse injury, patellofemoral syndrome. Patient likely has overuse injury such as tendinitis or patellofemoral syndrome. I recommended rest, ice, elevation, and motrin. I also recommended some physical therapy including quadricep strengthening. X-ray unremarkable. Patient reassured and discharged. Disposition     Discharged    DISCHARGE PLAN:  1. Current Discharge Medication List      CONTINUE these medications which have NOT CHANGED    Details   topiramate (TOPAMAX) 50 mg tablet Take 2 Tabs by mouth two (2) times daily (with meals).   Qty: 120 Tab, Refills: 2    Associated Diagnoses: Complicated migraine           2. Follow-up Information     Follow up With Specialties Details Why Contact Info    [address] [facility] Emergency Medicine Go today As soon as possible if symptoms worsen [address] [place] [id number] [phone]    Lety Ag MD Orthopedic Surgery Schedule an appointment as soon as possible for a visit  As needed. This is an orthopedic doctor. [address] [place] [address]  [phone]          3. Return to ED if worse     Diagnosis     Clinical impression:   1. Patellofemoral pain syndrome of left knee           Attestation:  Please note that this dictation was completed with [email or web address] the computer voice recognition software. Quite often unanticipated grammatical, syntax, homophones, and other interpretive errors are inadvertently transcribed by the computer software. Please disregard these errors. Please excuse any errors that have escaped final proofreading. Thank you.   Katelyn Keith, DO

## 2022-03-19 PROBLEM — E66.01 SEVERE OBESITY (HCC): Status: ACTIVE | Noted: 2020-08-03

## 2023-05-23 RX ORDER — TOPIRAMATE 50 MG/1
100 TABLET, FILM COATED ORAL 2 TIMES DAILY WITH MEALS
COMMUNITY
Start: 2020-10-14